# Patient Record
Sex: MALE | Race: WHITE | NOT HISPANIC OR LATINO | Employment: OTHER | URBAN - METROPOLITAN AREA
[De-identification: names, ages, dates, MRNs, and addresses within clinical notes are randomized per-mention and may not be internally consistent; named-entity substitution may affect disease eponyms.]

---

## 2019-05-16 NOTE — PATIENT DISCUSSION
Surgical vs nonsurgical treatment options were discussed.  Patient desires to continue with conservative therapy of warm compresses and vincenzo bid.  (pt has).  If this does not resolve, may send for biopsy.  Follow up 1 month with Yazmin DICKENS

## 2019-06-12 NOTE — PROCEDURE NOTE: CLINICAL
PROCEDURE NOTE: Biopsy of Eyelid Right Lower Lid. Diagnosis: Eyelid Lesion, Uncertain Behavior. Prior to treatment, the risks/benefits/alternatives were discussed. The patient wished to proceed with procedure. The area of the surgical site was prepped surgical scrub. 0.5 cc of 2% lidocaine with epinephrine was injected beneath the lesion. The lesion was excised with Sue scissors. The defect was cauterized. Erythromycin ointment was placed on the biopsy site. Patient tolerated procedure well. There were no complications. The lesion was placed in formalin and sent to a pathology lab. Post procedure instructions given. Carri Duff PROCEDURE NOTE: Probing of Lacrimal Canaliculi, With or Without Irrigation OU. Diagnosis: Epiphora. Prior to treatment, the risks/benefits/alternatives were discussed. The patient wished to proceed with procedure. Patient tolerated procedure well. There were no complications. Post-op instructions given. Carri Duff

## 2019-06-12 NOTE — PATIENT DISCUSSION
P&I done today and both puncta were found to be open.  Some saline did come out of upper puncta on the left, which could indicate a possible partial blockage.  Discussed that tearing is multifactorial.

## 2019-06-12 NOTE — PATIENT DISCUSSION
Recommended excision/biopsy due to patient discomfort.  Patient desires to proceed today.  We will call with results in about a week.  Apply maxitrol vincenzo bid until heals. (pt already has).

## 2019-06-12 NOTE — PATIENT DISCUSSION
has improved since last month, but due to patient's concerns and history of skin cancer, will biopsy today.

## 2020-11-19 ENCOUNTER — NEW PATIENT COMPREHENSIVE (OUTPATIENT)
Dept: URBAN - METROPOLITAN AREA CLINIC 35 | Facility: CLINIC | Age: 75
End: 2020-11-19

## 2020-11-19 DIAGNOSIS — H25.9: ICD-10-CM

## 2020-11-19 DIAGNOSIS — H52.13: ICD-10-CM

## 2020-11-19 DIAGNOSIS — H43.812: ICD-10-CM

## 2020-11-19 DIAGNOSIS — H18.513: ICD-10-CM

## 2020-11-19 PROCEDURE — 92015 DETERMINE REFRACTIVE STATE: CPT

## 2020-11-19 PROCEDURE — 92134 CPTRZ OPH DX IMG PST SGM RTA: CPT

## 2020-11-19 PROCEDURE — 92004 COMPRE OPH EXAM NEW PT 1/>: CPT

## 2020-11-19 ASSESSMENT — KERATOMETRY
OS_K1POWER_DIOPTERS: 43.75
OS_K2POWER_DIOPTERS: 43.25
OD_K2POWER_DIOPTERS: 43
OD_K1POWER_DIOPTERS: 44.5

## 2020-11-19 ASSESSMENT — TONOMETRY
OS_IOP_MMHG: 16
OD_IOP_MMHG: 15

## 2020-11-19 ASSESSMENT — VISUAL ACUITY
OS_SC: J1
OD_SC: J2
OS_CC: 20/70-1
OD_PH: 20/40-1
OD_SC: CF 4FT
OS_SC: 20/400
OS_PH: 20/50
OD_CC: 20/60-1

## 2020-11-23 ENCOUNTER — CATARACT CONSULT (OUTPATIENT)
Dept: URBAN - METROPOLITAN AREA CLINIC 35 | Facility: CLINIC | Age: 75
End: 2020-11-23

## 2020-11-23 DIAGNOSIS — H25.811: ICD-10-CM

## 2020-11-23 DIAGNOSIS — H25.812: ICD-10-CM

## 2020-11-23 PROCEDURE — 92136TC INTERFEROMETRY - TECHNICAL COMPONENT

## 2020-11-23 PROCEDURE — 92014 COMPRE OPH EXAM EST PT 1/>: CPT

## 2020-11-23 RX ORDER — NEPAFENAC 3 MG/ML: 1 SUSPENSION/ DROPS OPHTHALMIC ONCE A DAY

## 2020-11-23 RX ORDER — DUREZOL 0.5 MG/ML: 1 EMULSION OPHTHALMIC TWICE A DAY

## 2020-11-23 RX ORDER — MOXIFLOXACIN HYDROCHLORIDE 5 MG/ML: 1 SOLUTION/ DROPS OPHTHALMIC

## 2020-11-23 ASSESSMENT — KERATOMETRY
OD_K2POWER_DIOPTERS: 43
OS_K1POWER_DIOPTERS: 43.75
OD_K1POWER_DIOPTERS: 44.5
OS_K2POWER_DIOPTERS: 43.25

## 2020-11-23 ASSESSMENT — VISUAL ACUITY
OS_BAT: 20/50
OD_SC: J1-
OS_PH: 20/30-
OS_AM: 20/20
OD_CC: 20/50-2
OS_SC: CF 6FT
OD_RAM: 20/20
OD_PH: 20/40
OD_SC: CF 5FT
OS_CC: 20/60-1
OS_SC: J1

## 2020-11-23 ASSESSMENT — TONOMETRY
OD_IOP_MMHG: 18
OS_IOP_MMHG: 17

## 2020-12-08 ASSESSMENT — KERATOMETRY
OD_K2POWER_DIOPTERS: 43
OS_K1POWER_DIOPTERS: 43.75
OS_K2POWER_DIOPTERS: 43.25
OD_K1POWER_DIOPTERS: 44.5

## 2020-12-10 ENCOUNTER — H&P (OUTPATIENT)
Dept: URBAN - METROPOLITAN AREA CLINIC 39 | Facility: CLINIC | Age: 75
End: 2020-12-10

## 2020-12-10 ENCOUNTER — SURGERY/PROCEDURE (OUTPATIENT)
Dept: URBAN - METROPOLITAN AREA CLINIC 35 | Facility: CLINIC | Age: 75
End: 2020-12-10

## 2020-12-10 DIAGNOSIS — H25.812: ICD-10-CM

## 2020-12-10 DIAGNOSIS — H25.811: ICD-10-CM

## 2020-12-10 PROCEDURE — 66984 XCAPSL CTRC RMVL W/O ECP: CPT

## 2020-12-10 PROCEDURE — 99211T TECH SERVICE

## 2020-12-10 ASSESSMENT — KERATOMETRY
OS_K1POWER_DIOPTERS: 43.75
OD_K1POWER_DIOPTERS: 44.5
OD_K2POWER_DIOPTERS: 43
OS_K2POWER_DIOPTERS: 43.25

## 2020-12-11 ENCOUNTER — CATARACT POST-OP 1-DAY (OUTPATIENT)
Dept: URBAN - METROPOLITAN AREA CLINIC 35 | Facility: CLINIC | Age: 75
End: 2020-12-11

## 2020-12-11 DIAGNOSIS — Z96.1: ICD-10-CM

## 2020-12-11 DIAGNOSIS — H25.811: ICD-10-CM

## 2020-12-11 ASSESSMENT — KERATOMETRY
OS_K1POWER_DIOPTERS: 43.75
OS_K2POWER_DIOPTERS: 43.25
OD_K1POWER_DIOPTERS: 44.5
OD_K2POWER_DIOPTERS: 43

## 2020-12-11 ASSESSMENT — TONOMETRY: OS_IOP_MMHG: 24

## 2020-12-11 ASSESSMENT — VISUAL ACUITY: OS_SC: 20/40+2

## 2020-12-16 ASSESSMENT — KERATOMETRY
OS_K2POWER_DIOPTERS: 43.25
OD_K1POWER_DIOPTERS: 44.5
OD_K2POWER_DIOPTERS: 43
OS_K1POWER_DIOPTERS: 43.75

## 2020-12-17 ENCOUNTER — POST OP/EVAL OF SECOND EYE (OUTPATIENT)
Dept: URBAN - METROPOLITAN AREA CLINIC 39 | Facility: CLINIC | Age: 75
End: 2020-12-17

## 2020-12-17 ENCOUNTER — SURGERY/PROCEDURE (OUTPATIENT)
Dept: URBAN - METROPOLITAN AREA CLINIC 35 | Facility: CLINIC | Age: 75
End: 2020-12-17

## 2020-12-17 DIAGNOSIS — H25.811: ICD-10-CM

## 2020-12-17 DIAGNOSIS — Z96.1: ICD-10-CM

## 2020-12-17 PROCEDURE — 99213 OFFICE O/P EST LOW 20 MIN: CPT

## 2020-12-17 PROCEDURE — 66984 XCAPSL CTRC RMVL W/O ECP: CPT

## 2020-12-17 ASSESSMENT — VISUAL ACUITY
OD_BAT: 20/400 WITH MR
OS_SC: 20/20

## 2020-12-17 ASSESSMENT — TONOMETRY
OS_IOP_MMHG: 18
OD_IOP_MMHG: 18

## 2020-12-18 ENCOUNTER — CATARACT POST-OP 1-DAY (OUTPATIENT)
Dept: URBAN - METROPOLITAN AREA CLINIC 39 | Facility: CLINIC | Age: 75
End: 2020-12-18

## 2020-12-18 DIAGNOSIS — Z96.1: ICD-10-CM

## 2020-12-18 ASSESSMENT — TONOMETRY: OD_IOP_MMHG: 20

## 2020-12-18 ASSESSMENT — VISUAL ACUITY: OD_SC: 20/40

## 2020-12-28 ENCOUNTER — POST-OP (OUTPATIENT)
Dept: URBAN - METROPOLITAN AREA CLINIC 35 | Facility: CLINIC | Age: 75
End: 2020-12-28

## 2020-12-28 DIAGNOSIS — Z96.1: ICD-10-CM

## 2020-12-28 PROCEDURE — 99024 POSTOP FOLLOW-UP VISIT: CPT

## 2020-12-28 ASSESSMENT — TONOMETRY
OS_IOP_MMHG: 18
OD_IOP_MMHG: 18

## 2020-12-28 ASSESSMENT — KERATOMETRY
OS_K2POWER_DIOPTERS: 43.25
OS_K1POWER_DIOPTERS: 43.75
OD_K2POWER_DIOPTERS: 43
OD_K1POWER_DIOPTERS: 44.5

## 2020-12-28 ASSESSMENT — VISUAL ACUITY
OS_SC: 20/20
OD_SC: 20/30

## 2021-01-11 ENCOUNTER — POST-OP (OUTPATIENT)
Dept: URBAN - METROPOLITAN AREA CLINIC 35 | Facility: CLINIC | Age: 76
End: 2021-01-11

## 2021-01-11 DIAGNOSIS — Z96.1: ICD-10-CM

## 2021-01-11 PROCEDURE — 99024 POSTOP FOLLOW-UP VISIT: CPT

## 2021-01-11 ASSESSMENT — VISUAL ACUITY
OD_SC: 20/25-1
OS_SC: 20/20

## 2021-01-11 ASSESSMENT — KERATOMETRY
OD_K1POWER_DIOPTERS: 44.5
OD_K2POWER_DIOPTERS: 43
OS_K1POWER_DIOPTERS: 43.75
OS_K2POWER_DIOPTERS: 43.25

## 2021-01-11 ASSESSMENT — TONOMETRY
OD_IOP_MMHG: 16
OS_IOP_MMHG: 15

## 2021-01-20 ENCOUNTER — FOLLOW UP (OUTPATIENT)
Dept: URBAN - METROPOLITAN AREA CLINIC 35 | Facility: CLINIC | Age: 76
End: 2021-01-20

## 2021-01-20 DIAGNOSIS — Z96.1: ICD-10-CM

## 2021-01-20 PROCEDURE — 99024 POSTOP FOLLOW-UP VISIT: CPT

## 2021-01-20 ASSESSMENT — TONOMETRY
OS_IOP_MMHG: 14
OD_IOP_MMHG: 14

## 2021-01-20 ASSESSMENT — VISUAL ACUITY
OD_CC: J1
OS_CC: 20/25
OS_CC: J1
OD_CC: 20/25-2

## 2021-07-20 ENCOUNTER — ESTABLISHED COMPREHENSIVE EXAM (OUTPATIENT)
Dept: URBAN - METROPOLITAN AREA CLINIC 35 | Facility: CLINIC | Age: 76
End: 2021-07-20

## 2021-07-20 DIAGNOSIS — Z96.1: ICD-10-CM

## 2021-07-20 DIAGNOSIS — H01.021: ICD-10-CM

## 2021-07-20 DIAGNOSIS — H01.024: ICD-10-CM

## 2021-07-20 DIAGNOSIS — H18.513: ICD-10-CM

## 2021-07-20 DIAGNOSIS — H43.812: ICD-10-CM

## 2021-07-20 DIAGNOSIS — H26.40: ICD-10-CM

## 2021-07-20 PROCEDURE — 92134 CPTRZ OPH DX IMG PST SGM RTA: CPT

## 2021-07-20 PROCEDURE — 92014 COMPRE OPH EXAM EST PT 1/>: CPT

## 2021-07-20 ASSESSMENT — VISUAL ACUITY
OS_CC: 20/30-2
OS_CC: J6
OD_CC: J1-
OD_CC: 20/30-2

## 2021-07-20 ASSESSMENT — TONOMETRY
OD_IOP_MMHG: 18
OS_IOP_MMHG: 18

## 2022-07-20 ENCOUNTER — COMPREHENSIVE EXAM (OUTPATIENT)
Dept: URBAN - METROPOLITAN AREA CLINIC 35 | Facility: CLINIC | Age: 77
End: 2022-07-20

## 2022-07-20 DIAGNOSIS — H52.13: ICD-10-CM

## 2022-07-20 DIAGNOSIS — H18.513: ICD-10-CM

## 2022-07-20 DIAGNOSIS — H43.812: ICD-10-CM

## 2022-07-20 DIAGNOSIS — H26.40: ICD-10-CM

## 2022-07-20 DIAGNOSIS — H01.021: ICD-10-CM

## 2022-07-20 DIAGNOSIS — H01.024: ICD-10-CM

## 2022-07-20 PROCEDURE — 92015 DETERMINE REFRACTIVE STATE: CPT

## 2022-07-20 PROCEDURE — 92014 COMPRE OPH EXAM EST PT 1/>: CPT

## 2022-07-20 ASSESSMENT — KERATOMETRY
OS_AXISANGLE2_DEGREES: 145
OS_K2POWER_DIOPTERS: 43.02
OD_K1POWER_DIOPTERS: 42.75
OD_AXISANGLE2_DEGREES: 170
OS_AXISANGLE_DEGREES: 55
OD_AXISANGLE_DEGREES: 80
OS_K1POWER_DIOPTERS: 43.00
OD_K2POWER_DIOPTERS: 43.50

## 2022-07-20 ASSESSMENT — TONOMETRY
OD_IOP_MMHG: 17
OS_IOP_MMHG: 17

## 2022-07-20 ASSESSMENT — VISUAL ACUITY
OD_CC: J1
OD_CC: 20/30
OU_CC: 20/25
OS_CC: 20/30
OS_CC: J3

## 2022-09-09 ASSESSMENT — KERATOMETRY
OD_AXISANGLE_DEGREES: 80
OD_AXISANGLE2_DEGREES: 170
OD_K2POWER_DIOPTERS: 43.50
OS_K1POWER_DIOPTERS: 43.00
OS_K2POWER_DIOPTERS: 43.02
OS_AXISANGLE_DEGREES: 55
OS_AXISANGLE2_DEGREES: 145
OD_K1POWER_DIOPTERS: 42.75

## 2022-09-14 ENCOUNTER — SURGERY/PROCEDURE (OUTPATIENT)
Dept: URBAN - METROPOLITAN AREA SURGERY 14 | Facility: SURGERY | Age: 77
End: 2022-09-14

## 2022-09-14 ENCOUNTER — CONSULTATION/EVALUATION (OUTPATIENT)
Dept: URBAN - METROPOLITAN AREA CLINIC 39 | Facility: CLINIC | Age: 77
End: 2022-09-14

## 2022-09-14 DIAGNOSIS — H43.812: ICD-10-CM

## 2022-09-14 DIAGNOSIS — Z96.1: ICD-10-CM

## 2022-09-14 DIAGNOSIS — H26.493: ICD-10-CM

## 2022-09-14 DIAGNOSIS — H01.024: ICD-10-CM

## 2022-09-14 DIAGNOSIS — H18.513: ICD-10-CM

## 2022-09-14 DIAGNOSIS — H01.021: ICD-10-CM

## 2022-09-14 PROCEDURE — 6682150 YAG CAPSULOTOMY

## 2022-09-14 PROCEDURE — 92014 COMPRE OPH EXAM EST PT 1/>: CPT

## 2022-09-14 ASSESSMENT — VISUAL ACUITY
OD_SC: 20/40
OS_BAT: 20/100
OD_BAT: 20/80
OS_SC: 20/40

## 2022-09-14 ASSESSMENT — TONOMETRY
OS_IOP_MMHG: 14
OD_IOP_MMHG: 14

## 2022-09-20 ASSESSMENT — KERATOMETRY
OD_K1POWER_DIOPTERS: 42.75
OD_K2POWER_DIOPTERS: 43.50
OS_K1POWER_DIOPTERS: 43.00
OD_AXISANGLE_DEGREES: 80
OS_AXISANGLE_DEGREES: 55
OS_AXISANGLE2_DEGREES: 145
OS_K2POWER_DIOPTERS: 43.02
OD_AXISANGLE2_DEGREES: 170

## 2022-10-24 ENCOUNTER — POST-OP (OUTPATIENT)
Dept: URBAN - METROPOLITAN AREA CLINIC 35 | Facility: CLINIC | Age: 77
End: 2022-10-24

## 2022-10-24 DIAGNOSIS — Z98.890: ICD-10-CM

## 2022-10-24 DIAGNOSIS — Z96.1: ICD-10-CM

## 2022-10-24 PROCEDURE — 99024 POSTOP FOLLOW-UP VISIT: CPT

## 2022-10-24 ASSESSMENT — VISUAL ACUITY
OS_SC: J10
OU_SC: J10
OU_SC: 20/30+1
OS_SC: 20/30+1
OD_SC: J10-
OD_SC: 20/30-2

## 2022-10-24 ASSESSMENT — KERATOMETRY
OD_K2POWER_DIOPTERS: 43.50
OS_AXISANGLE2_DEGREES: 145
OS_K2POWER_DIOPTERS: 43.02
OD_AXISANGLE_DEGREES: 80
OD_K1POWER_DIOPTERS: 42.75
OD_AXISANGLE2_DEGREES: 170
OS_AXISANGLE_DEGREES: 55
OS_K1POWER_DIOPTERS: 43.00

## 2022-10-24 ASSESSMENT — TONOMETRY
OD_IOP_MMHG: 13
OS_IOP_MMHG: 13

## 2023-07-17 ENCOUNTER — COMPREHENSIVE EXAM (OUTPATIENT)
Dept: URBAN - METROPOLITAN AREA CLINIC 35 | Facility: CLINIC | Age: 78
End: 2023-07-17

## 2023-07-17 DIAGNOSIS — H43.812: ICD-10-CM

## 2023-07-17 DIAGNOSIS — H52.13: ICD-10-CM

## 2023-07-17 DIAGNOSIS — H01.021: ICD-10-CM

## 2023-07-17 DIAGNOSIS — H18.513: ICD-10-CM

## 2023-07-17 DIAGNOSIS — H01.024: ICD-10-CM

## 2023-07-17 PROCEDURE — 92014 COMPRE OPH EXAM EST PT 1/>: CPT

## 2023-07-17 PROCEDURE — 92015 DETERMINE REFRACTIVE STATE: CPT

## 2023-07-17 ASSESSMENT — TONOMETRY
OD_IOP_MMHG: 15
OS_IOP_MMHG: 15

## 2023-07-17 ASSESSMENT — VISUAL ACUITY
OS_SC: 20/30
OU_CC: J1
OU_CC: 20/20
OU_SC: 20/25+1
OD_SC: 20/30-2
OS_CC: J1
OD_CC: 20/20-2
OS_CC: 20/20-1
OD_CC: J1-

## 2024-07-18 ENCOUNTER — COMPREHENSIVE EXAM (OUTPATIENT)
Dept: URBAN - METROPOLITAN AREA CLINIC 35 | Facility: CLINIC | Age: 79
End: 2024-07-18

## 2024-07-18 DIAGNOSIS — H18.513: ICD-10-CM

## 2024-07-18 DIAGNOSIS — H43.812: ICD-10-CM

## 2024-07-18 DIAGNOSIS — H52.13: ICD-10-CM

## 2024-07-18 DIAGNOSIS — H01.021: ICD-10-CM

## 2024-07-18 DIAGNOSIS — H01.024: ICD-10-CM

## 2024-07-18 PROCEDURE — 92014 COMPRE OPH EXAM EST PT 1/>: CPT

## 2024-07-18 PROCEDURE — 92015 DETERMINE REFRACTIVE STATE: CPT

## 2024-07-18 ASSESSMENT — VISUAL ACUITY
OD_CC: J2
OU_CC: 20/20-2
OD_CC: 20/25+1
OS_CC: J3
OU_CC: J1
OS_CC: 20/20-2

## 2024-07-18 ASSESSMENT — TONOMETRY
OS_IOP_MMHG: 16
OD_IOP_MMHG: 15

## 2025-07-21 ENCOUNTER — COMPREHENSIVE EXAM (OUTPATIENT)
Age: 80
End: 2025-07-21

## 2025-07-21 DIAGNOSIS — H43.812: ICD-10-CM

## 2025-07-21 DIAGNOSIS — H01.024: ICD-10-CM

## 2025-07-21 DIAGNOSIS — B88.0: ICD-10-CM

## 2025-07-21 DIAGNOSIS — H52.13: ICD-10-CM

## 2025-07-21 DIAGNOSIS — H01.021: ICD-10-CM

## 2025-07-21 DIAGNOSIS — H18.513: ICD-10-CM

## 2025-07-21 PROCEDURE — 92015 DETERMINE REFRACTIVE STATE: CPT

## 2025-07-21 PROCEDURE — 92014 COMPRE OPH EXAM EST PT 1/>: CPT
